# Patient Record
Sex: FEMALE | URBAN - METROPOLITAN AREA
[De-identification: names, ages, dates, MRNs, and addresses within clinical notes are randomized per-mention and may not be internally consistent; named-entity substitution may affect disease eponyms.]

---

## 2023-06-14 ENCOUNTER — OFFICE VISIT (OUTPATIENT)
Dept: URBAN - METROPOLITAN AREA CLINIC 27 | Facility: CLINIC | Age: 68
End: 2023-06-14
Payer: COMMERCIAL

## 2023-06-14 DIAGNOSIS — H43.811 VITREOUS DEGENERATION, RIGHT EYE: ICD-10-CM

## 2023-06-14 DIAGNOSIS — H25.13 AGE-RELATED NUCLEAR CATARACT, BILATERAL: ICD-10-CM

## 2023-06-14 DIAGNOSIS — H33.311 OPERCULUM OF RETINA W/O DETACHMENT OF RIGHT EYE: Primary | ICD-10-CM

## 2023-06-14 DIAGNOSIS — H43.11 VITREOUS HEMORRHAGE, RIGHT EYE: ICD-10-CM

## 2023-06-14 PROCEDURE — 92134 CPTRZ OPH DX IMG PST SGM RTA: CPT | Performed by: OPHTHALMOLOGY

## 2023-06-14 PROCEDURE — 99205 OFFICE O/P NEW HI 60 MIN: CPT | Performed by: OPHTHALMOLOGY

## 2023-06-14 PROCEDURE — 67145 PROPH RTA DTCHMNT PC: CPT | Performed by: OPHTHALMOLOGY

## 2023-06-14 ASSESSMENT — INTRAOCULAR PRESSURE
OD: 17
OS: 15

## 2023-06-14 NOTE — IMPRESSION/PLAN
Impression: Vitreous degeneration, right eye: H43.811.  Plan: --hemorrhagic PVD OD
--treat RT as above
--RDW discussed

## 2023-06-14 NOTE — IMPRESSION/PLAN
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 OCT: detached hyaloid OD, no fluid/traction Plan: --exam confirms a peripheral operculated retinal tear OD
--findings/diagnosis d/w patient in detail --recommend urgent laser retinopexy to prevent RD and vision loss --r/b/a of laser retinopexy, cryotherapy, and observation discussed --pt understands risk of RD and vision loss w/o treatment
--pt understands risk of treatment, inc pain, vision loss, floaters --pt elects to proceed with indirect laser retinopexy today
--no complications encountered RTC 4 weeks for DFE OD

## 2023-07-12 ENCOUNTER — OFFICE VISIT (OUTPATIENT)
Dept: URBAN - METROPOLITAN AREA CLINIC 27 | Facility: CLINIC | Age: 68
End: 2023-07-12
Payer: COMMERCIAL

## 2023-07-12 DIAGNOSIS — H43.811 VITREOUS DEGENERATION, RIGHT EYE: ICD-10-CM

## 2023-07-12 DIAGNOSIS — H33.311 OPERCULUM OF RETINA W/O DETACHMENT OF RIGHT EYE: Primary | ICD-10-CM

## 2023-07-12 DIAGNOSIS — H25.13 AGE-RELATED NUCLEAR CATARACT, BILATERAL: ICD-10-CM

## 2023-07-12 DIAGNOSIS — H43.11 VITREOUS HEMORRHAGE, RIGHT EYE: ICD-10-CM

## 2023-07-12 PROCEDURE — 99213 OFFICE O/P EST LOW 20 MIN: CPT | Performed by: OPHTHALMOLOGY

## 2023-07-12 ASSESSMENT — INTRAOCULAR PRESSURE
OD: 18
OS: 19

## 2023-07-12 NOTE — IMPRESSION/PLAN
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
s/p laser retinopexy 03/14/23 Plan: --retina remains attached
--operculate RH is barricaded by laser scarring
--no new holes/tears/RD upon 360 exam
--RDW discussed RTC PRN

## 2023-07-12 NOTE — IMPRESSION/PLAN
Impression: Vitreous degeneration, right eye: H43.837.  Plan: --hemorrhagic PVD OD
--s/p RH repair as above
--RDW discussed